# Patient Record
Sex: MALE | Race: WHITE | NOT HISPANIC OR LATINO | Employment: FULL TIME | URBAN - NONMETROPOLITAN AREA
[De-identification: names, ages, dates, MRNs, and addresses within clinical notes are randomized per-mention and may not be internally consistent; named-entity substitution may affect disease eponyms.]

---
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
## 2020-08-25 ENCOUNTER — HOSPITAL ENCOUNTER (OUTPATIENT)
Facility: HOSPITAL | Age: 20
Discharge: HOME | End: 2020-08-25
Payer: MEDICAID

## 2020-08-25 ENCOUNTER — OFFICE VISIT (OUTPATIENT)
Dept: FAMILY MEDICINE CLINIC | Age: 20
End: 2020-08-25
Payer: MEDICAID

## 2020-08-25 VITALS
HEIGHT: 70 IN | HEART RATE: 92 BPM | SYSTOLIC BLOOD PRESSURE: 126 MMHG | WEIGHT: 233.2 LBS | DIASTOLIC BLOOD PRESSURE: 79 MMHG | OXYGEN SATURATION: 98 % | TEMPERATURE: 98 F | BODY MASS INDEX: 33.39 KG/M2

## 2020-08-25 PROCEDURE — 87390 HIV-1 AG IA: CPT

## 2020-08-25 PROCEDURE — 99202 OFFICE O/P NEW SF 15 MIN: CPT | Performed by: NURSE PRACTITIONER

## 2020-08-25 PROCEDURE — 86702 HIV-2 ANTIBODY: CPT

## 2020-08-25 PROCEDURE — 4004F PT TOBACCO SCREEN RCVD TLK: CPT | Performed by: NURSE PRACTITIONER

## 2020-08-25 PROCEDURE — G8419 CALC BMI OUT NRM PARAM NOF/U: HCPCS | Performed by: NURSE PRACTITIONER

## 2020-08-25 PROCEDURE — 36415 COLL VENOUS BLD VENIPUNCTURE: CPT

## 2020-08-25 PROCEDURE — 86803 HEPATITIS C AB TEST: CPT

## 2020-08-25 PROCEDURE — G8427 DOCREV CUR MEDS BY ELIG CLIN: HCPCS | Performed by: NURSE PRACTITIONER

## 2020-08-25 PROCEDURE — 86701 HIV-1ANTIBODY: CPT

## 2020-08-25 PROCEDURE — 86706 HEP B SURFACE ANTIBODY: CPT

## 2020-08-25 SDOH — HEALTH STABILITY: MENTAL HEALTH: HOW OFTEN DO YOU HAVE A DRINK CONTAINING ALCOHOL?: NEVER

## 2020-08-25 ASSESSMENT — ENCOUNTER SYMPTOMS
DIARRHEA: 0
COUGH: 0
NAUSEA: 0
SHORTNESS OF BREATH: 0
ABDOMINAL PAIN: 0
VOMITING: 0

## 2020-08-25 NOTE — PROGRESS NOTES
SUBJECTIVE:    Yumiko Franco is a 21 y.o. male    Pt reports he was pulling a weeds out of a flower bed yesterday around 1530 yesterday. Pt reports he thought he was being poked by a wood chip but his friend realized it was a needle. The needle stick occurred to right middle finger. Pt reports he immediately washed his hands  Pt reports this is not a workers comp injury, this was done on a personal job. Pt reports no hx of exposure to HIV or Hepatitis. No drug abuse hx. No hx of risky sexual behaviors. Pt does not have tattoos. Pt reports last tetanus vaccine was 2 years ago. Last tetanus booster was 2-3 years ago per patient. Chief Complaint   Patient presents with    Body Fluid Exposure     08/24/2020 needle stick, right middle finger        Review of Systems   Constitutional: Negative. HENT: Negative. Eyes: Negative. Respiratory: Negative for cough and shortness of breath. Cardiovascular: Negative for chest pain. Gastrointestinal: Negative for abdominal pain, diarrhea, nausea and vomiting. Endocrine: Negative. Genitourinary: Negative. Musculoskeletal: Negative. Skin: Positive for wound (needle stick to right 3rd finger. ). Allergic/Immunologic: Negative. Neurological: Negative. Hematological: Negative. Psychiatric/Behavioral: Negative. OBJECTIVE:    /79   Pulse 92   Temp 98 °F (36.7 °C) (Oral)   Ht 5' 10\" (1.778 m)   Wt 233 lb 3.2 oz (105.8 kg)   SpO2 98%   BMI 33.46 kg/m²    Physical Exam  Constitutional:       General: He is not in acute distress. Appearance: Normal appearance. He is well-developed and normal weight. He is not ill-appearing, toxic-appearing or diaphoretic. HENT:      Head: Normocephalic. Neck:      Thyroid: No thyromegaly. Vascular: No carotid bruit. Cardiovascular:      Rate and Rhythm: Normal rate and regular rhythm. Heart sounds: Normal heart sounds. No murmur.    Pulmonary:      Effort: Pulmonary effort is normal.      Breath sounds: Normal breath sounds. Skin:     General: Skin is warm and dry. Capillary Refill: Capillary refill takes less than 2 seconds. Neurological:      Mental Status: He is alert and oriented to person, place, and time. Psychiatric:         Behavior: Behavior normal.         ASSESSMENT/PLAN:   Vista Landau was seen today for body fluid exposure. Diagnoses and all orders for this visit:    Exposure to body fluid due to accidental needlestick injury  -     HEPATITIS C ANTIBODY; Future  -     HIV-1 AND HIV-2 ANTIBODIES; Future  -     HEPATITIS B SURFACE ANTIBODY; Future        No follow-ups on file. No current outpatient medications on file prior to visit. No current facility-administered medications on file prior to visit.

## 2020-08-25 NOTE — PATIENT INSTRUCTIONS
Education and Discharge Instructions:  Keep a list of your medicines with you at all times. Always bring a up to date list or the medication bottles when going to the doctor or hospital.   Always follow the directions on your medications unless the doctor or nurse has instructed you otherwise. Keep all medications out of reach of children. Store medicines according to the directions on the label. Seek emergency medical attention if you think you have used too much medication. A overdose can be fatal.  Don't share your medicines with anyone. It may harm them. Discard any unused or out dated medications. If you have any questions, ask your provider or pharmacist for more information. Be sure to keep all appointments for provider visits or tests. Please continue all your medications that the Provider has prescribed for you unless other [facility]    1. Mental Health Info and Treatment [phone]  2. Drug and Alcohol Detox Rehab treatment 24 hr [phone]  3. Stop Smoking Classes- [facility] [place]-[phone]  4. [address] Program- prescription prdnqrgggs-[phone]  5. Hospice Care [phone]  6. Adult/Child Protection HLQGONFD-[phone]          . We are committed to providing you with the best care possible. In order to help us achieve these goals please remember to bring all medications, herbal products, and over the counter supplements with you to each visit. If your provider has ordered testing for you, please be sure to follow up with our office if you have not received results within 7 days after the testing took place. *If you receive a survey after visiting one of our offices, please take time to share your experience concerning your physician office visit. These surveys are confidential and no health information about you is shared.   We are eager to improve for you and we are counting on your

## 2020-08-26 LAB
HBV SURFACE AB TITR SER: <3.5 MIU/ML
HEPATITIS C ANTIBODY INTERPRETATION: NORMAL

## 2020-08-27 LAB
HIV AG/AB: NORMAL
HIV ANTIGEN: NORMAL
HIV-1 ANTIBODY: NORMAL
HIV-2 AB: NORMAL

## 2020-08-27 NOTE — PATIENT INSTRUCTIONS
Education and Discharge Instructions:  Keep a list of your medicines with you at all times. Always bring a up to date list or the medication bottles when going to the doctor or hospital.   Always follow the directions on your medications unless the doctor or nurse has instructed you otherwise. Keep all medications out of reach of children. Store medicines according to the directions on the label. Seek emergency medical attention if you think you have used too much medication. A overdose can be fatal.  Don't share your medicines with anyone. It may harm them. Discard any unused or out dated medications. If you have any questions, ask your provider or pharmacist for more information. Be sure to keep all appointments for provider visits or tests. Please continue all your medications that the Provider has prescribed for you unless other [facility]    1. Mental Health Info and Treatment [phone]  2. Drug and Alcohol Detox Rehab treatment 24 hr nchkybhw-[phone]  3. Stop Smoking Classes- [facility] [place]-[phone]  4. [address] Program- prescription nhnrqfekwd-[phone]  5. Hospice Care Yiqu-[phone]  6. Adult/Child Protection [phone]          . We are committed to providing you with the best care possible. In order to help us achieve these goals please remember to bring all medications, herbal products, and over the counter supplements with you to each visit. If your provider has ordered testing for you, please be sure to follow up with our office if you have not received results within 7 days after the testing took place. *If you receive a survey after visiting one of our offices, please take time to share your experience concerning your physician office visit. These surveys are confidential and no health information about you is shared.   We are eager to improve for you and we are counting on your feedback to help make that happen

## 2020-08-31 ASSESSMENT — ENCOUNTER SYMPTOMS
ALLERGIC/IMMUNOLOGIC NEGATIVE: 1
EYES NEGATIVE: 1

## 2020-09-01 ENCOUNTER — OFFICE VISIT (OUTPATIENT)
Dept: FAMILY MEDICINE CLINIC | Age: 20
End: 2020-09-01
Payer: MEDICAID

## 2020-09-01 VITALS
TEMPERATURE: 98.1 F | DIASTOLIC BLOOD PRESSURE: 70 MMHG | WEIGHT: 235.6 LBS | HEART RATE: 114 BPM | BODY MASS INDEX: 33.81 KG/M2 | OXYGEN SATURATION: 98 % | SYSTOLIC BLOOD PRESSURE: 110 MMHG

## 2020-09-01 PROCEDURE — G8427 DOCREV CUR MEDS BY ELIG CLIN: HCPCS | Performed by: NURSE PRACTITIONER

## 2020-09-01 PROCEDURE — G8417 CALC BMI ABV UP PARAM F/U: HCPCS | Performed by: NURSE PRACTITIONER

## 2020-09-01 PROCEDURE — 4004F PT TOBACCO SCREEN RCVD TLK: CPT | Performed by: NURSE PRACTITIONER

## 2020-09-01 PROCEDURE — 99212 OFFICE O/P EST SF 10 MIN: CPT | Performed by: NURSE PRACTITIONER

## 2020-09-01 ASSESSMENT — ENCOUNTER SYMPTOMS
COUGH: 0
ABDOMINAL PAIN: 0
NAUSEA: 0
SHORTNESS OF BREATH: 0
ALLERGIC/IMMUNOLOGIC NEGATIVE: 1
EYES NEGATIVE: 1
DIARRHEA: 0
VOMITING: 0
RESPIRATORY NEGATIVE: 1

## 2020-09-01 NOTE — PROGRESS NOTES
SUBJECTIVE:    Vance De Leon is a 21 y.o. male    Pt reports he was pulling a weeds out of a flower bed yesterday on 8/24/202 when he was poked by a needle in the flower bed. Pt described needle as \"the kind people shoot up with\". Pt had screening labs done on 08/25/2020. Pt presents today for results. Hep C antibody, HIV 1 and HIV 2 antibodies and Hep B surface antibodies are all negative. Chief Complaint   Patient presents with   Valentin Uzhun     results of most recent blood work        Review of Systems   Constitutional: Negative. HENT: Negative. Eyes: Negative. Respiratory: Negative. Negative for cough and shortness of breath. Cardiovascular: Negative for chest pain. Gastrointestinal: Negative for abdominal pain, diarrhea, nausea and vomiting. Genitourinary: Negative. Musculoskeletal: Negative. Skin: Negative. Allergic/Immunologic: Negative. Neurological: Negative. Hematological: Negative. Psychiatric/Behavioral: Negative. OBJECTIVE:    /70   Pulse 114   Temp 98.1 °F (36.7 °C) (Oral)   Wt 235 lb 9.6 oz (106.9 kg)   SpO2 98%   BMI 33.81 kg/m²    Physical Exam  Vitals signs and nursing note reviewed. Constitutional:       Appearance: He is well-developed. Neck:      Thyroid: No thyromegaly. Vascular: No carotid bruit. Cardiovascular:      Rate and Rhythm: Normal rate and regular rhythm. Heart sounds: Normal heart sounds. No murmur. Pulmonary:      Effort: Pulmonary effort is normal.      Breath sounds: Normal breath sounds. Skin:     General: Skin is warm and dry. Neurological:      Mental Status: He is alert and oriented to person, place, and time. Psychiatric:         Behavior: Behavior normal.         ASSESSMENT/PLAN:   Mohan was seen today for discuss labs.     Diagnoses and all orders for this visit:    Exposure to body fluid due to accidental needlestick injury        Return in about 3 months (around 12/1/2020) for repeat labs. No current outpatient medications on file prior to visit. No current facility-administered medications on file prior to visit.

## 2020-10-13 ENCOUNTER — TELEPHONE (OUTPATIENT)
Dept: FAMILY MEDICINE CLINIC | Age: 20
End: 2020-10-13

## 2020-10-13 NOTE — TELEPHONE ENCOUNTER
Patient called office today asking if he could come in to get follow up blood work done from needle stick on 08/24/2020. Advised patient that last note stated to f/u in 3 months around 12/01. Patient ask if he could come in today. Please Advise.

## 2021-04-10 ENCOUNTER — HOSPITAL ENCOUNTER (EMERGENCY)
Facility: HOSPITAL | Age: 21
Discharge: HOME | End: 2021-04-10
Attending: EMERGENCY MEDICINE | Admitting: EMERGENCY MEDICINE

## 2021-04-10 VITALS
WEIGHT: 239.8 LBS | BODY MASS INDEX: 33.57 KG/M2 | RESPIRATION RATE: 18 BRPM | DIASTOLIC BLOOD PRESSURE: 80 MMHG | OXYGEN SATURATION: 99 % | SYSTOLIC BLOOD PRESSURE: 145 MMHG | HEART RATE: 88 BPM | TEMPERATURE: 98 F | HEIGHT: 71 IN

## 2021-04-10 DIAGNOSIS — K62.5 RECTAL BLEEDING: Primary | ICD-10-CM

## 2021-04-10 LAB
BASOPHILS # BLD AUTO: 0.03 10*3/MM3 (ref 0–0.2)
BASOPHILS NFR BLD AUTO: 0.3 % (ref 0–1.5)
DEPRECATED RDW RBC AUTO: 40.5 FL (ref 37–54)
EOSINOPHIL # BLD AUTO: 0.06 10*3/MM3 (ref 0–0.4)
EOSINOPHIL NFR BLD AUTO: 0.6 % (ref 0.3–6.2)
ERYTHROCYTE [DISTWIDTH] IN BLOOD BY AUTOMATED COUNT: 12.4 % (ref 12.3–15.4)
HCT VFR BLD AUTO: 42.2 % (ref 37.5–51)
HGB BLD-MCNC: 14.2 G/DL (ref 13–17.7)
IMM GRANULOCYTES # BLD AUTO: 0.04 10*3/MM3 (ref 0–0.05)
IMM GRANULOCYTES NFR BLD AUTO: 0.4 % (ref 0–0.5)
LYMPHOCYTES # BLD AUTO: 3.17 10*3/MM3 (ref 0.7–3.1)
LYMPHOCYTES NFR BLD AUTO: 32.5 % (ref 19.6–45.3)
MCH RBC QN AUTO: 30.1 PG (ref 26.6–33)
MCHC RBC AUTO-ENTMCNC: 33.6 G/DL (ref 31.5–35.7)
MCV RBC AUTO: 89.4 FL (ref 79–97)
MONOCYTES # BLD AUTO: 0.77 10*3/MM3 (ref 0.1–0.9)
MONOCYTES NFR BLD AUTO: 7.9 % (ref 5–12)
NEUTROPHILS NFR BLD AUTO: 5.68 10*3/MM3 (ref 1.7–7)
NEUTROPHILS NFR BLD AUTO: 58.3 % (ref 42.7–76)
NRBC BLD AUTO-RTO: 0 /100 WBC (ref 0–0.2)
PLATELET # BLD AUTO: 254 10*3/MM3 (ref 140–450)
PMV BLD AUTO: 9.7 FL (ref 6–12)
RBC # BLD AUTO: 4.72 10*6/MM3 (ref 4.14–5.8)
WBC # BLD AUTO: 9.75 10*3/MM3 (ref 3.4–10.8)

## 2021-04-10 PROCEDURE — 99283 EMERGENCY DEPT VISIT LOW MDM: CPT

## 2021-04-10 PROCEDURE — 85025 COMPLETE CBC W/AUTO DIFF WBC: CPT | Performed by: PHYSICIAN ASSISTANT

## 2021-04-10 RX ORDER — HYDROCORTISONE ACETATE 25 MG/1
25 SUPPOSITORY RECTAL 2 TIMES DAILY
Qty: 12 SUPPOSITORY | Refills: 0 | Status: SHIPPED | OUTPATIENT
Start: 2021-04-10

## 2021-04-11 NOTE — ED PROVIDER NOTES
Subjective   21-year-old male presents with bright red blood per rectum.  He has had 3-4 episodes of bowel movement with blood tinged stool, he also noticed blood when he wiped.  No abdominal pain no nausea vomiting fever chills.      History provided by:  Patient   used: No        Review of Systems   Gastrointestinal: Positive for blood in stool.   All other systems reviewed and are negative.      History reviewed. No pertinent past medical history.    Allergies   Allergen Reactions   • Ceclor [Cefaclor] Hives   • Augmentin [Amoxicillin-Pot Clavulanate] Rash   • Bactrim [Sulfamethoxazole-Trimethoprim] Rash   • Zithromax [Azithromycin] Rash       History reviewed. No pertinent surgical history.    History reviewed. No pertinent family history.    Social History     Socioeconomic History   • Marital status: Single     Spouse name: Not on file   • Number of children: Not on file   • Years of education: Not on file   • Highest education level: Not on file   Tobacco Use   • Smoking status: Passive Smoke Exposure - Never Smoker   Substance and Sexual Activity   • Alcohol use: Never   • Drug use: Never           Objective   Physical Exam  Vitals and nursing note reviewed.   Constitutional:       Appearance: He is well-developed.   HENT:      Head: Normocephalic and atraumatic.   Cardiovascular:      Rate and Rhythm: Normal rate and regular rhythm.   Pulmonary:      Effort: Pulmonary effort is normal.      Breath sounds: Normal breath sounds.   Abdominal:      General: Bowel sounds are normal.      Palpations: Abdomen is soft.   Genitourinary:     Rectum: Normal.   Musculoskeletal:         General: Normal range of motion.      Cervical back: Normal range of motion.   Skin:     General: Skin is warm and dry.   Neurological:      Mental Status: He is alert and oriented to person, place, and time.   Psychiatric:         Behavior: Behavior normal.         Thought Content: Thought content normal.          Judgment: Judgment normal.         Procedures           ED Course                                           MDM  Number of Diagnoses or Management Options  Rectal bleeding: new and requires workup     Amount and/or Complexity of Data Reviewed  Clinical lab tests: reviewed    Risk of Complications, Morbidity, and/or Mortality  Presenting problems: minimal  Management options: minimal    Patient Progress  Patient progress: stable      Final diagnoses:   Rectal bleeding       ED Disposition  ED Disposition     ED Disposition Condition Comment    Discharge Stable           Hugo Hugo MD  [address]  [facility] [address] [phone]    Schedule an appointment as soon as possible for a visit       [facility] Emergency Department  [address] [address] [phone]    If symptoms worsen         Medication List      New Prescriptions    hydrocortisone 25 MG suppository  Commonly known as: ANUSOL-HC  Insert 1 suppository into the rectum 2 (Two) Times a Day.           Where to Get Your Medications      These medications were sent to [facility][id number] [address] AT [facility] [address]  [phone] [facility] [phone] [address] [address]    Phone: [phone]   · hydrocortisone 25 MG suppository          Itz Robins Jr., PA-C  04/10/21 2035

## 2023-05-24 ENCOUNTER — TRANSCRIBE ORDERS (OUTPATIENT)
Dept: ADMINISTRATIVE | Facility: HOSPITAL | Age: 23
End: 2023-05-24
Payer: COMMERCIAL

## 2023-05-24 DIAGNOSIS — R10.11 RIGHT UPPER QUADRANT ABDOMINAL PAIN: Primary | ICD-10-CM
